# Patient Record
Sex: MALE | Race: WHITE | NOT HISPANIC OR LATINO
[De-identification: names, ages, dates, MRNs, and addresses within clinical notes are randomized per-mention and may not be internally consistent; named-entity substitution may affect disease eponyms.]

---

## 2019-11-05 PROBLEM — Z00.00 ENCOUNTER FOR PREVENTIVE HEALTH EXAMINATION: Status: ACTIVE | Noted: 2019-11-05

## 2019-11-25 ENCOUNTER — APPOINTMENT (OUTPATIENT)
Dept: OTOLARYNGOLOGY | Facility: CLINIC | Age: 54
End: 2019-11-25
Payer: COMMERCIAL

## 2019-11-25 VITALS
DIASTOLIC BLOOD PRESSURE: 76 MMHG | HEART RATE: 87 BPM | SYSTOLIC BLOOD PRESSURE: 116 MMHG | WEIGHT: 169 LBS | BODY MASS INDEX: 26.53 KG/M2 | HEIGHT: 67 IN

## 2019-11-25 DIAGNOSIS — Z87.438 PERSONAL HISTORY OF OTHER DISEASES OF MALE GENITAL ORGANS: ICD-10-CM

## 2019-11-25 DIAGNOSIS — Z78.9 OTHER SPECIFIED HEALTH STATUS: ICD-10-CM

## 2019-11-25 DIAGNOSIS — Z87.19 PERSONAL HISTORY OF OTHER DISEASES OF THE DIGESTIVE SYSTEM: ICD-10-CM

## 2019-11-25 PROCEDURE — 92557 COMPREHENSIVE HEARING TEST: CPT

## 2019-11-25 PROCEDURE — 92567 TYMPANOMETRY: CPT

## 2019-11-25 PROCEDURE — 99213 OFFICE O/P EST LOW 20 MIN: CPT | Mod: 25

## 2019-11-25 RX ORDER — DUTASTERIDE 0.5 MG/1
0.5 CAPSULE, LIQUID FILLED ORAL
Refills: 0 | Status: ACTIVE | COMMUNITY

## 2019-11-25 RX ORDER — ALFUZOSIN HYDROCHLORIDE 10 MG/1
10 TABLET, EXTENDED RELEASE ORAL
Refills: 0 | Status: ACTIVE | COMMUNITY

## 2019-11-25 NOTE — ASSESSMENT
[FreeTextEntry1] : cerumen cleared au\par audio  loss 6000 8000 hz au some decrease from last exam\par c tymp as\par now w uri\par fu cerumen and audio 1 y

## 2019-11-25 NOTE — REVIEW OF SYSTEMS
[Sneezing] : sneezing [Post Nasal Drip] : post nasal drip [Hearing Loss] : hearing loss [Nasal Congestion] : nasal congestion [Problem Snoring] : problem snoring [Throat Clearing] : throat clearing [Cough] : cough [Negative] : Endocrine [Patient Intake Form Reviewed] : Patient intake form was reviewed

## 2019-11-27 ENCOUNTER — TRANSCRIPTION ENCOUNTER (OUTPATIENT)
Age: 54
End: 2019-11-27

## 2020-11-03 ENCOUNTER — APPOINTMENT (OUTPATIENT)
Dept: OTOLARYNGOLOGY | Facility: CLINIC | Age: 55
End: 2020-11-03
Payer: COMMERCIAL

## 2020-11-03 VITALS — BODY MASS INDEX: 25.9 KG/M2 | WEIGHT: 165 LBS | HEIGHT: 67 IN | TEMPERATURE: 97.4 F

## 2020-11-03 PROCEDURE — 99072 ADDL SUPL MATRL&STAF TM PHE: CPT

## 2020-11-03 PROCEDURE — 99213 OFFICE O/P EST LOW 20 MIN: CPT

## 2020-11-03 NOTE — PHYSICAL EXAM
[de-identified] : large [place] alfredo cleared au [Midline] : trachea located in midline position [Normal] : no rashes

## 2021-04-28 ENCOUNTER — NON-APPOINTMENT (OUTPATIENT)
Age: 56
End: 2021-04-28

## 2021-04-28 ENCOUNTER — APPOINTMENT (OUTPATIENT)
Dept: DERMATOLOGY | Facility: CLINIC | Age: 56
End: 2021-04-28
Payer: COMMERCIAL

## 2021-04-28 VITALS — WEIGHT: 165 LBS | BODY MASS INDEX: 25.9 KG/M2 | HEIGHT: 67 IN

## 2021-04-28 PROCEDURE — 17000 DESTRUCT PREMALG LESION: CPT

## 2021-04-28 PROCEDURE — 99072 ADDL SUPL MATRL&STAF TM PHE: CPT

## 2021-04-28 PROCEDURE — 99204 OFFICE O/P NEW MOD 45 MIN: CPT | Mod: 25

## 2021-04-28 NOTE — HISTORY OF PRESENT ILLNESS
[de-identified] : Pt. presents for skin check;\par c/o few spots of concern;  scalp, face; \par Severity:  mild  \par Modifying factors:  none\par Associated symptoms:  none\par Context:  no association with activity

## 2021-04-28 NOTE — ASSESSMENT
[FreeTextEntry1] : Complete skin examination is negative for malignancy; Multiple new concerns were addressed and discussed.\par Therapeutic options and their risks and benefits; along with multiple diagnostic possibilities were discussed at length;\par risks and benefits of skin biopsy and/or other further study were discussed;\par LN2 to AK top of scalp\par Continue regular exams;\par \par inflamed cyst;  L cheek;  present approx 6 wks;  if persists, will schedule for small I&D/possible excision

## 2021-04-28 NOTE — PHYSICAL EXAM
[Full Body Skin Exam Performed] : performed [FreeTextEntry3] : Skin examination performed of the face, neck, trunk, arms, legs; \par The patient is well, alert and oriented, pleasant and cooperative.\par Eyelids, conjunctivae, oral mucosa, digits and nails all normal.  \par No cervical adenopathy.\par \par Normal findings include:\par \par Seborrheic keratoses\par Angiomas\par Lentigines\par scaling erythematous papule; R scalp crown\par + nontender, fluctuant subcutaneous nodule - Left cheek\par \par No lesions were suspicious for malignancy. \par \par

## 2021-05-10 ENCOUNTER — APPOINTMENT (OUTPATIENT)
Dept: DERMATOLOGY | Facility: CLINIC | Age: 56
End: 2021-05-10
Payer: COMMERCIAL

## 2021-05-10 DIAGNOSIS — L72.3 SEBACEOUS CYST: ICD-10-CM

## 2021-05-10 PROCEDURE — 10061 I&D ABSCESS COMP/MULTIPLE: CPT

## 2021-05-10 PROCEDURE — 99072 ADDL SUPL MATRL&STAF TM PHE: CPT

## 2021-09-20 ENCOUNTER — APPOINTMENT (OUTPATIENT)
Dept: OTOLARYNGOLOGY | Facility: CLINIC | Age: 56
End: 2021-09-20
Payer: COMMERCIAL

## 2021-09-20 VITALS — BODY MASS INDEX: 25.9 KG/M2 | HEIGHT: 67 IN | WEIGHT: 165 LBS | TEMPERATURE: 97.5 F

## 2021-09-20 PROCEDURE — 99213 OFFICE O/P EST LOW 20 MIN: CPT | Mod: 25

## 2021-09-20 PROCEDURE — 92557 COMPREHENSIVE HEARING TEST: CPT

## 2021-09-20 PROCEDURE — 92567 TYMPANOMETRY: CPT

## 2021-09-20 PROCEDURE — G0268 REMOVAL OF IMPACTED WAX MD: CPT

## 2021-09-20 NOTE — DATA REVIEWED
[de-identified] : No change in hearing since previous eval\par Moderate to moderately-severe 6-8000 Hz right ear\par Mild to severe SNHL 4-8000 hz left ear\par Note positive middle ear pressure right ear\par REC retest one year

## 2021-09-20 NOTE — REASON FOR VISIT
[Subsequent Evaluation] : a subsequent evaluation for [Hearing Loss] : hearing loss [FreeTextEntry2] : ear wax

## 2022-09-19 ENCOUNTER — APPOINTMENT (OUTPATIENT)
Dept: OTOLARYNGOLOGY | Facility: CLINIC | Age: 57
End: 2022-09-19

## 2022-10-07 ENCOUNTER — APPOINTMENT (OUTPATIENT)
Dept: OTOLARYNGOLOGY | Facility: CLINIC | Age: 57
End: 2022-10-07

## 2022-10-07 VITALS — WEIGHT: 170 LBS | TEMPERATURE: 97.2 F | HEIGHT: 67 IN | BODY MASS INDEX: 26.68 KG/M2

## 2022-10-07 DIAGNOSIS — H93.13 TINNITUS, BILATERAL: ICD-10-CM

## 2022-10-07 PROCEDURE — 69210 REMOVE IMPACTED EAR WAX UNI: CPT

## 2022-10-07 PROCEDURE — 99212 OFFICE O/P EST SF 10 MIN: CPT | Mod: 25

## 2023-08-21 ENCOUNTER — APPOINTMENT (OUTPATIENT)
Dept: INTERNAL MEDICINE | Facility: CLINIC | Age: 58
End: 2023-08-21

## 2023-09-26 ENCOUNTER — APPOINTMENT (OUTPATIENT)
Dept: DERMATOLOGY | Facility: CLINIC | Age: 58
End: 2023-09-26
Payer: COMMERCIAL

## 2023-09-26 DIAGNOSIS — L82.1 OTHER SEBORRHEIC KERATOSIS: ICD-10-CM

## 2023-09-26 DIAGNOSIS — L25.5 UNSPECIFIED CONTACT DERMATITIS DUE TO PLANTS, EXCEPT FOOD: ICD-10-CM

## 2023-09-26 DIAGNOSIS — L57.0 ACTINIC KERATOSIS: ICD-10-CM

## 2023-09-26 DIAGNOSIS — L81.4 OTHER MELANIN HYPERPIGMENTATION: ICD-10-CM

## 2023-09-26 PROCEDURE — 99214 OFFICE O/P EST MOD 30 MIN: CPT

## 2023-09-26 RX ORDER — BETAMETHASONE DIPROPIONATE 0.5 MG/G
0.05 GEL TOPICAL
Qty: 1 | Refills: 2 | Status: ACTIVE | COMMUNITY
Start: 2023-09-26 | End: 1900-01-01

## 2023-11-17 ENCOUNTER — APPOINTMENT (OUTPATIENT)
Dept: OTOLARYNGOLOGY | Facility: CLINIC | Age: 58
End: 2023-11-17
Payer: COMMERCIAL

## 2023-11-17 VITALS — TEMPERATURE: 98.1 F

## 2023-11-17 VITALS — WEIGHT: 165 LBS | BODY MASS INDEX: 25.9 KG/M2 | HEIGHT: 67 IN

## 2023-11-17 DIAGNOSIS — H61.23 IMPACTED CERUMEN, BILATERAL: ICD-10-CM

## 2023-11-17 DIAGNOSIS — R05.3 CHRONIC COUGH: ICD-10-CM

## 2023-11-17 DIAGNOSIS — H69.93 UNSPECIFIED EUSTACHIAN TUBE DISORDER, BILATERAL: ICD-10-CM

## 2023-11-17 DIAGNOSIS — H90.3 SENSORINEURAL HEARING LOSS, BILATERAL: ICD-10-CM

## 2023-11-17 DIAGNOSIS — U07.1 COVID-19: ICD-10-CM

## 2023-11-17 PROCEDURE — 92557 COMPREHENSIVE HEARING TEST: CPT

## 2023-11-17 PROCEDURE — G0268 REMOVAL OF IMPACTED WAX MD: CPT

## 2023-11-17 PROCEDURE — 99213 OFFICE O/P EST LOW 20 MIN: CPT | Mod: 25

## 2023-11-17 PROCEDURE — 92567 TYMPANOMETRY: CPT

## 2023-11-17 RX ORDER — METHYLPREDNISOLONE 4 MG/1
4 TABLET ORAL
Qty: 1 | Refills: 1 | Status: ACTIVE | COMMUNITY
Start: 2023-11-17 | End: 1900-01-01

## 2023-11-17 RX ORDER — HYDROCODONE BITARTRATE AND HOMATROPINE METHYLBROMIDE 1.5; 5 MG/5ML; MG/5ML
5-1.5 SOLUTION ORAL
Qty: 240 | Refills: 0 | Status: ACTIVE | COMMUNITY
Start: 2023-11-17 | End: 1900-01-01

## 2024-12-05 ENCOUNTER — APPOINTMENT (OUTPATIENT)
Dept: OTOLARYNGOLOGY | Facility: CLINIC | Age: 59
End: 2024-12-05
Payer: COMMERCIAL

## 2024-12-05 ENCOUNTER — NON-APPOINTMENT (OUTPATIENT)
Age: 59
End: 2024-12-05

## 2024-12-05 VITALS — BODY MASS INDEX: 25.9 KG/M2 | HEIGHT: 67 IN | WEIGHT: 165 LBS

## 2024-12-05 DIAGNOSIS — H61.23 IMPACTED CERUMEN, BILATERAL: ICD-10-CM

## 2024-12-05 DIAGNOSIS — H90.3 SENSORINEURAL HEARING LOSS, BILATERAL: ICD-10-CM

## 2024-12-05 PROCEDURE — 99213 OFFICE O/P EST LOW 20 MIN: CPT

## 2025-01-24 ENCOUNTER — DOCTOR'S OFFICE (OUTPATIENT)
Facility: LOCATION | Age: 60
Setting detail: OPHTHALMOLOGY
End: 2025-01-24
Payer: COMMERCIAL

## 2025-01-24 DIAGNOSIS — H43.393: ICD-10-CM

## 2025-01-24 DIAGNOSIS — H53.40: ICD-10-CM

## 2025-01-24 DIAGNOSIS — H02.831: ICD-10-CM

## 2025-01-24 DIAGNOSIS — H02.834: ICD-10-CM

## 2025-01-24 PROCEDURE — 99204 OFFICE O/P NEW MOD 45 MIN: CPT | Performed by: OPHTHALMOLOGY

## 2025-01-24 PROCEDURE — 92285 EXTERNAL OCULAR PHOTOGRAPHY: CPT | Performed by: OPHTHALMOLOGY

## 2025-01-24 PROCEDURE — 92081 LIMITED VISUAL FIELD XM: CPT | Performed by: OPHTHALMOLOGY

## 2025-01-24 ASSESSMENT — LID POSITION - DERMATOCHALASIS
OS_DERMATOCHALASIS: LUL
OD_DERMATOCHALASIS: RUL

## 2025-01-24 ASSESSMENT — VISUAL ACUITY
OS_BCVA: 20/30-2
OD_BCVA: 20/25+1

## 2025-01-24 ASSESSMENT — CONFRONTATIONAL VISUAL FIELD TEST (CVF)
OD_FINDINGS: FULL
OS_FINDINGS: FULL

## 2025-02-05 ENCOUNTER — OFFICE (OUTPATIENT)
Dept: URBAN - METROPOLITAN AREA CLINIC 102 | Facility: CLINIC | Age: 60
Setting detail: OPHTHALMOLOGY
End: 2025-02-05
Payer: COMMERCIAL

## 2025-02-05 DIAGNOSIS — H53.40: ICD-10-CM

## 2025-02-05 DIAGNOSIS — H04.123: ICD-10-CM

## 2025-02-05 DIAGNOSIS — H02.831: ICD-10-CM

## 2025-02-05 DIAGNOSIS — H04.122: ICD-10-CM

## 2025-02-05 DIAGNOSIS — H04.121: ICD-10-CM

## 2025-02-05 DIAGNOSIS — H02.834: ICD-10-CM

## 2025-02-05 PROCEDURE — 83861 MICROFLUID ANALY TEARS: CPT | Mod: LT | Performed by: OPHTHALMOLOGY

## 2025-02-05 PROCEDURE — 92012 INTRM OPH EXAM EST PATIENT: CPT | Performed by: OPHTHALMOLOGY

## 2025-02-05 PROCEDURE — 83861 MICROFLUID ANALY TEARS: CPT | Mod: RT | Performed by: OPHTHALMOLOGY

## 2025-02-05 ASSESSMENT — TEAR BREAK UP TIME (TBUT)
OD_TBUT: T
OS_TBUT: T

## 2025-02-05 ASSESSMENT — VISUAL ACUITY
OD_BCVA: 20/30
OS_BCVA: 20/30-1

## 2025-02-05 ASSESSMENT — CONFRONTATIONAL VISUAL FIELD TEST (CVF)
OS_FINDINGS: FULL
OD_FINDINGS: FULL

## 2025-02-05 ASSESSMENT — LID POSITION - DERMATOCHALASIS
OS_DERMATOCHALASIS: LUL
OD_DERMATOCHALASIS: RUL

## 2025-02-18 ENCOUNTER — ASC (OUTPATIENT)
Dept: URBAN - METROPOLITAN AREA SURGERY 8 | Facility: SURGERY | Age: 60
Setting detail: OPHTHALMOLOGY
End: 2025-02-18
Payer: COMMERCIAL

## 2025-02-18 DIAGNOSIS — H02.831: ICD-10-CM

## 2025-02-18 DIAGNOSIS — H02.834: ICD-10-CM

## 2025-02-18 PROCEDURE — 15823 BLEPHARP UPR EYELID XCSV SKN: CPT | Mod: E1,E3 | Performed by: OPHTHALMOLOGY

## 2025-02-24 ENCOUNTER — OFFICE (OUTPATIENT)
Dept: URBAN - METROPOLITAN AREA CLINIC 100 | Facility: CLINIC | Age: 60
Setting detail: OPHTHALMOLOGY
End: 2025-02-24
Payer: COMMERCIAL

## 2025-02-24 ENCOUNTER — RX ONLY (RX ONLY)
Age: 60
End: 2025-02-24

## 2025-02-24 DIAGNOSIS — H02.831: ICD-10-CM

## 2025-02-24 DIAGNOSIS — H02.834: ICD-10-CM

## 2025-02-24 PROBLEM — H04.123 VITREOUS FLOATERS; BOTH EYES: Status: ACTIVE | Noted: 2025-02-05

## 2025-02-24 PROCEDURE — 99024 POSTOP FOLLOW-UP VISIT: CPT | Performed by: OPHTHALMOLOGY

## 2025-02-24 ASSESSMENT — LID POSITION - DERMATOCHALASIS
OD_DERMATOCHALASIS: ABSENT
OS_DERMATOCHALASIS: ABSENT

## 2025-02-24 ASSESSMENT — TEAR BREAK UP TIME (TBUT)
OS_TBUT: T
OD_TBUT: T

## 2025-02-24 ASSESSMENT — LID POSITION - COMMENTS
OS_COMMENTS: INCISION INTACT
OD_COMMENTS: INCISION INTACT

## 2025-02-24 ASSESSMENT — VISUAL ACUITY
OD_BCVA: 20/30-1
OS_BCVA: 20/30-2

## 2025-02-24 ASSESSMENT — CONFRONTATIONAL VISUAL FIELD TEST (CVF)
OD_FINDINGS: FULL
OS_FINDINGS: FULL

## 2025-04-22 ENCOUNTER — OFFICE (OUTPATIENT)
Dept: URBAN - METROPOLITAN AREA CLINIC 70 | Facility: CLINIC | Age: 60
Setting detail: OPHTHALMOLOGY
End: 2025-04-22
Payer: COMMERCIAL

## 2025-04-22 DIAGNOSIS — H02.831: ICD-10-CM

## 2025-04-22 DIAGNOSIS — H04.123: ICD-10-CM

## 2025-04-22 DIAGNOSIS — H02.834: ICD-10-CM

## 2025-04-22 PROCEDURE — 99024 POSTOP FOLLOW-UP VISIT: CPT | Performed by: OPHTHALMOLOGY

## 2025-04-22 ASSESSMENT — CONFRONTATIONAL VISUAL FIELD TEST (CVF)
OS_FINDINGS: FULL
OD_FINDINGS: FULL

## 2025-04-22 ASSESSMENT — VISUAL ACUITY
OS_BCVA: 20/30-2
OD_BCVA: 20/30-1

## 2025-04-22 ASSESSMENT — TEAR BREAK UP TIME (TBUT)
OD_TBUT: T
OS_TBUT: T

## 2025-04-22 ASSESSMENT — LID POSITION - COMMENTS
OS_COMMENTS: INCISION INTACT
OD_COMMENTS: INCISION INTACT

## 2025-04-22 ASSESSMENT — LID POSITION - DERMATOCHALASIS
OD_DERMATOCHALASIS: ABSENT
OS_DERMATOCHALASIS: ABSENT